# Patient Record
Sex: MALE | URBAN - METROPOLITAN AREA
[De-identification: names, ages, dates, MRNs, and addresses within clinical notes are randomized per-mention and may not be internally consistent; named-entity substitution may affect disease eponyms.]

---

## 2018-08-23 ENCOUNTER — OFFICE VISIT (OUTPATIENT)
Dept: URBAN - METROPOLITAN AREA CLINIC 23 | Facility: CLINIC | Age: 53
End: 2018-08-23
Payer: COMMERCIAL

## 2018-08-23 DIAGNOSIS — H25.813 COMBINED FORMS OF AGE-RELATED CATARACT, BILATERAL: Primary | ICD-10-CM

## 2018-08-23 PROCEDURE — 92136 OPHTHALMIC BIOMETRY: CPT | Performed by: OPHTHALMOLOGY

## 2018-08-23 PROCEDURE — 99213 OFFICE O/P EST LOW 20 MIN: CPT | Performed by: OPHTHALMOLOGY

## 2018-08-23 ASSESSMENT — KERATOMETRY
OD: 45.50
OS: 46.00

## 2018-08-23 ASSESSMENT — INTRAOCULAR PRESSURE
OD: 17
OS: 17

## 2018-08-23 ASSESSMENT — VISUAL ACUITY
OS: 20/50
OD: 20/25

## 2018-08-23 NOTE — IMPRESSION/PLAN
Impression: Combined forms of age-related cataract, bilateral: H25.813. Condition: quality of life issue. Symptoms: could improve with surgery. Vision: vision affected. Plan: Cataracts account for the patient's complaints. Discussed all risks, benefits, procedures and recovery. Patient understands changing glasses will not improve vision. Patient desires to have surgery, recommend phacoemulsification with intraocular lens. RL-2 Discussed IOL options. Discussed multifocal IOL vs standard IOL with distance correction. Discussed probable post-operative anisometropia.   Recommend Symfony IOL OU

## 2018-09-06 ENCOUNTER — SURGERY (OUTPATIENT)
Dept: URBAN - METROPOLITAN AREA SURGERY 11 | Facility: SURGERY | Age: 53
End: 2018-09-06
Payer: COMMERCIAL

## 2018-09-06 PROCEDURE — 66984 XCAPSL CTRC RMVL W/O ECP: CPT | Performed by: OPHTHALMOLOGY

## 2018-09-07 ENCOUNTER — POST-OPERATIVE VISIT (OUTPATIENT)
Dept: URBAN - METROPOLITAN AREA CLINIC 23 | Facility: CLINIC | Age: 53
End: 2018-09-07

## 2018-09-07 PROCEDURE — 99024 POSTOP FOLLOW-UP VISIT: CPT | Performed by: OPTOMETRIST

## 2018-09-07 ASSESSMENT — INTRAOCULAR PRESSURE
OS: 24
OD: 15

## 2018-09-13 ENCOUNTER — POST-OPERATIVE VISIT (OUTPATIENT)
Dept: URBAN - METROPOLITAN AREA CLINIC 23 | Facility: CLINIC | Age: 53
End: 2018-09-13

## 2018-09-13 PROCEDURE — 99024 POSTOP FOLLOW-UP VISIT: CPT | Performed by: OPTOMETRIST

## 2018-09-13 ASSESSMENT — INTRAOCULAR PRESSURE: OS: 23

## 2018-10-04 ENCOUNTER — POST-OPERATIVE VISIT (OUTPATIENT)
Dept: URBAN - METROPOLITAN AREA CLINIC 23 | Facility: CLINIC | Age: 53
End: 2018-10-04

## 2018-10-04 PROCEDURE — 99024 POSTOP FOLLOW-UP VISIT: CPT | Performed by: OPTOMETRIST

## 2018-10-04 ASSESSMENT — INTRAOCULAR PRESSURE: OD: 19

## 2018-10-04 ASSESSMENT — VISUAL ACUITY: OS: 20/20

## 2019-04-19 ENCOUNTER — OFFICE VISIT (OUTPATIENT)
Dept: URBAN - METROPOLITAN AREA CLINIC 23 | Facility: CLINIC | Age: 54
End: 2019-04-19
Payer: COMMERCIAL

## 2019-04-19 ENCOUNTER — SURGERY (OUTPATIENT)
Dept: URBAN - METROPOLITAN AREA SURGERY 11 | Facility: SURGERY | Age: 54
End: 2019-04-19
Payer: COMMERCIAL

## 2019-04-19 DIAGNOSIS — H33.022 RETINAL DETACHMENT WITH MULTIPLE BREAKS, LEFT EYE: Primary | ICD-10-CM

## 2019-04-19 PROCEDURE — 67108 REPAIR DETACHED RETINA: CPT | Performed by: OPHTHALMOLOGY

## 2019-04-19 PROCEDURE — 92014 COMPRE OPH EXAM EST PT 1/>: CPT | Performed by: OPHTHALMOLOGY

## 2019-04-19 PROCEDURE — 92014 COMPRE OPH EXAM EST PT 1/>: CPT | Performed by: OPTOMETRIST

## 2019-04-19 PROCEDURE — 92134 CPTRZ OPH DX IMG PST SGM RTA: CPT | Performed by: OPHTHALMOLOGY

## 2019-04-19 RX ORDER — OFLOXACIN 3 MG/ML
0.3 % SOLUTION/ DROPS OPHTHALMIC
Qty: 5 | Refills: 3 | Status: INACTIVE
Start: 2019-04-19 | End: 2019-04-29

## 2019-04-19 RX ORDER — PREDNISOLONE ACETATE 10 MG/ML
1 % SUSPENSION/ DROPS OPHTHALMIC
Qty: 10 | Refills: 5 | Status: INACTIVE
Start: 2019-04-19 | End: 2019-04-26

## 2019-04-19 ASSESSMENT — INTRAOCULAR PRESSURE
OS: 20
OS: 20
OD: 20
OD: 20
OS: 20
OD: 20

## 2019-04-19 NOTE — IMPRESSION/PLAN
Impression: Retinal detachment with single break, left eye: H33.012. OS. Condition: unstable. Vision: vision affected. Plan: Discussed diagnosis in detail with patient. Discussed risks of progression. Surgical treatment is recommended to repair the retina LEFT EYE PPVx. Surgical risks and benefits were discussed, explained and understood by patient. Unable to tell how much vision will be recovered. Discussed gas bubble and post-op care: no traveling, flying or high altitude for approximately 6 - 8 weeks. All questions answered. Patient elects to proceed with recommendation. RL1. Educational material provided to patient. Erx Prednisolone and Ofloxacin to the pharmacy. OCT OS shows a RD. Proceed with emergency surgery OS today.

## 2019-04-19 NOTE — IMPRESSION/PLAN
Impression: Retinal detachment with multiple breaks, left eye: H33.022. Plan: Discussed findings with pt. Recommend see retina specialist ASAP.

## 2019-04-20 ENCOUNTER — POST-OPERATIVE VISIT (OUTPATIENT)
Dept: URBAN - METROPOLITAN AREA CLINIC 23 | Facility: CLINIC | Age: 54
End: 2019-04-20

## 2019-04-20 DIAGNOSIS — Z09 ENCNTR FOR F/U EXAM AFT TRTMT FOR COND OTH THAN MALIG NEOPLM: Primary | ICD-10-CM

## 2019-04-20 ASSESSMENT — INTRAOCULAR PRESSURE
OS: 12
OD: 15

## 2019-04-23 ENCOUNTER — POST-OPERATIVE VISIT (OUTPATIENT)
Dept: URBAN - METROPOLITAN AREA CLINIC 23 | Facility: CLINIC | Age: 54
End: 2019-04-23

## 2019-04-23 ASSESSMENT — INTRAOCULAR PRESSURE
OS: 32
OD: 22

## 2019-04-26 ENCOUNTER — POST-OPERATIVE VISIT (OUTPATIENT)
Dept: URBAN - METROPOLITAN AREA CLINIC 23 | Facility: CLINIC | Age: 54
End: 2019-04-26

## 2019-04-26 PROCEDURE — 99024 POSTOP FOLLOW-UP VISIT: CPT | Performed by: OPHTHALMOLOGY

## 2019-04-26 RX ORDER — BRIMONIDINE TARTRATE, TIMOLOL MALEATE 2; 5 MG/ML; MG/ML
SOLUTION/ DROPS OPHTHALMIC
Qty: 5 | Refills: 5 | Status: INACTIVE
Start: 2019-04-26 | End: 2019-05-13

## 2019-04-26 RX ORDER — DORZOLAMIDE HYDROCHLORIDE 20 MG/ML
2 % SOLUTION OPHTHALMIC
Qty: 10 | Refills: 5 | Status: INACTIVE
Start: 2019-04-26 | End: 2019-09-18

## 2019-04-26 RX ORDER — LOTEPREDNOL ETABONATE 5 MG/ML
0.5 % SUSPENSION/ DROPS OPHTHALMIC
Qty: 5 | Refills: 5 | Status: INACTIVE
Start: 2019-04-26 | End: 2019-09-18

## 2019-04-26 ASSESSMENT — INTRAOCULAR PRESSURE
OS: 35
OD: 22

## 2019-04-29 ENCOUNTER — POST-OPERATIVE VISIT (OUTPATIENT)
Dept: URBAN - METROPOLITAN AREA CLINIC 23 | Facility: CLINIC | Age: 54
End: 2019-04-29
Payer: COMMERCIAL

## 2019-04-29 ASSESSMENT — INTRAOCULAR PRESSURE
OD: 20
OS: 22

## 2019-04-29 ASSESSMENT — VISUAL ACUITY: OD: 20/30

## 2019-05-03 ENCOUNTER — POST-OPERATIVE VISIT (OUTPATIENT)
Dept: URBAN - METROPOLITAN AREA CLINIC 23 | Facility: CLINIC | Age: 54
End: 2019-05-03

## 2019-05-03 PROCEDURE — 99024 POSTOP FOLLOW-UP VISIT: CPT | Performed by: OPHTHALMOLOGY

## 2019-05-03 ASSESSMENT — INTRAOCULAR PRESSURE
OD: 17
OS: 28

## 2019-05-10 ENCOUNTER — POST-OPERATIVE VISIT (OUTPATIENT)
Dept: URBAN - METROPOLITAN AREA CLINIC 23 | Facility: CLINIC | Age: 54
End: 2019-05-10

## 2019-05-10 RX ORDER — NETARSUDIL 0.2 MG/ML
0.02 % SOLUTION/ DROPS OPHTHALMIC; TOPICAL
Qty: 0 | Refills: 0 | Status: INACTIVE
Start: 2019-05-10 | End: 2019-09-18

## 2019-05-10 ASSESSMENT — INTRAOCULAR PRESSURE: OS: 34

## 2019-05-14 ENCOUNTER — POST-OPERATIVE VISIT (OUTPATIENT)
Dept: URBAN - METROPOLITAN AREA CLINIC 23 | Facility: CLINIC | Age: 54
End: 2019-05-14
Payer: COMMERCIAL

## 2019-05-14 ASSESSMENT — INTRAOCULAR PRESSURE: OS: 26

## 2019-06-04 ENCOUNTER — POST-OPERATIVE VISIT (OUTPATIENT)
Dept: URBAN - METROPOLITAN AREA CLINIC 23 | Facility: CLINIC | Age: 54
End: 2019-06-04

## 2019-06-04 PROCEDURE — 99024 POSTOP FOLLOW-UP VISIT: CPT | Performed by: OPHTHALMOLOGY

## 2019-06-04 ASSESSMENT — INTRAOCULAR PRESSURE
OS: 20
OD: 18

## 2019-07-18 ENCOUNTER — POST-OPERATIVE VISIT (OUTPATIENT)
Dept: URBAN - METROPOLITAN AREA CLINIC 23 | Facility: CLINIC | Age: 54
End: 2019-07-18

## 2019-07-18 PROCEDURE — 99024 POSTOP FOLLOW-UP VISIT: CPT | Performed by: OPHTHALMOLOGY

## 2019-07-18 ASSESSMENT — INTRAOCULAR PRESSURE
OS: 22
OD: 22

## 2019-09-18 ENCOUNTER — OFFICE VISIT (OUTPATIENT)
Dept: URBAN - METROPOLITAN AREA CLINIC 23 | Facility: CLINIC | Age: 54
End: 2019-09-18
Payer: COMMERCIAL

## 2019-09-18 DIAGNOSIS — H35.411 LATTICE DEGENERATION OF RETINA, RIGHT EYE: ICD-10-CM

## 2019-09-18 PROCEDURE — 92134 CPTRZ OPH DX IMG PST SGM RTA: CPT | Performed by: OPHTHALMOLOGY

## 2019-09-18 PROCEDURE — 99213 OFFICE O/P EST LOW 20 MIN: CPT | Performed by: OPHTHALMOLOGY

## 2019-09-18 ASSESSMENT — INTRAOCULAR PRESSURE
OD: 14
OS: 17

## 2019-09-18 NOTE — IMPRESSION/PLAN
Impression: s/p PPVX for Repair of Retinal detachment with single break, left eye w/Gas  04/19/2019: H33.012. OS. Condition: resolved with surgery. Vision: vision affected but improving. Plan: Discussed diagnosis in detail with patient. Exam OS shows the retina is fully attached and stable. No treatment is required at this time. Patient states pupil size OS has decreased some. OCT OS is stable. Recommend a retina f/u in 3 mos. Patient can proceed with a refraction.

## 2019-09-18 NOTE — IMPRESSION/PLAN
Impression: Lattice degeneration of retina, right eye: H35.411. OD. Condition: stable. Plan: Discussed risks of progression. Discussed signs and symptoms of retinal detachment. Discussed signs and symptoms of PVD/floaters. Due to history of Retinal Detachment in the left eye, recommend prophylactic laser treatment in the right eye to treat the weak area as a preventive measure. Discussed the risks and benefits of tx. Patient defers treatment at this time. Will continue to monitor condition.

## 2019-12-17 ENCOUNTER — OFFICE VISIT (OUTPATIENT)
Dept: URBAN - METROPOLITAN AREA CLINIC 23 | Facility: CLINIC | Age: 54
End: 2019-12-17
Payer: COMMERCIAL

## 2019-12-17 DIAGNOSIS — H33.012 RETINAL DETACHMENT WITH SINGLE BREAK, LEFT EYE: Primary | ICD-10-CM

## 2019-12-17 PROCEDURE — 99213 OFFICE O/P EST LOW 20 MIN: CPT | Performed by: OPHTHALMOLOGY

## 2019-12-17 PROCEDURE — 92134 CPTRZ OPH DX IMG PST SGM RTA: CPT | Performed by: OPHTHALMOLOGY

## 2019-12-17 ASSESSMENT — INTRAOCULAR PRESSURE
OS: 18
OD: 19

## 2019-12-17 NOTE — IMPRESSION/PLAN
Impression: Lattice degeneration of retina, right eye: H35.411. OD. Condition: stable. Plan: Discussed risks of progression. Discussed signs and symptoms of retinal detachment. Discussed signs and symptoms of PVD/floaters. Due to history of Retinal Detachment in the left eye, recommend prophylactic laser treatment in the right eye to treat the weak area as a preventive measure. Discussed the risks and benefits of tx. All questions answered. RL1. Patient defers treatment at this time. Will continue to monitor condition, recommend a retina follow - up in 4 mos. Advised patient to come in ASAP if there is a change or decrease in vision.

## 2019-12-17 NOTE — IMPRESSION/PLAN
Impression: s/p PPVX for Repair of Retinal detachment with single break, left eye w/Gas  04/19/2019: H33.012. OS. Condition: resolved with surgery  and stable. Vision: vision affected but improved. Plan: Discussed diagnosis in detail with patient. Exam OS shows the retina is fully attached and stable. No additional treatment is required at this time. Maybe there is a minimal amount of inflammation. Recommend observation for now. Will reassess the retina in 4 - 6 mos. OCT OS shows ? minimal CME, no foveal depression, no ERM.

## 2022-01-06 ENCOUNTER — OFFICE VISIT (OUTPATIENT)
Dept: URBAN - METROPOLITAN AREA CLINIC 23 | Facility: CLINIC | Age: 57
End: 2022-01-06
Payer: COMMERCIAL

## 2022-01-06 DIAGNOSIS — H40.1134 PRIMARY OPEN-ANGLE GLAUCOMA, INDETERMINATE, BILATERAL: ICD-10-CM

## 2022-01-06 DIAGNOSIS — H52.4 PRESBYOPIA: ICD-10-CM

## 2022-01-06 DIAGNOSIS — H25.041 POSTERIOR SUBCAPSULAR POLAR AGE RELATED CATARACT, RIGHT EYE: Primary | ICD-10-CM

## 2022-01-06 PROCEDURE — 92133 CPTRZD OPH DX IMG PST SGM ON: CPT | Performed by: OPTOMETRIST

## 2022-01-06 PROCEDURE — 76514 ECHO EXAM OF EYE THICKNESS: CPT | Performed by: OPTOMETRIST

## 2022-01-06 PROCEDURE — 99213 OFFICE O/P EST LOW 20 MIN: CPT | Performed by: OPTOMETRIST

## 2022-01-06 RX ORDER — LATANOPROST 50 UG/ML
0.005 % SOLUTION OPHTHALMIC
Qty: 5 | Refills: 0 | Status: INACTIVE
Start: 2022-01-06 | End: 2022-02-09

## 2022-01-06 ASSESSMENT — KERATOMETRY
OS: 46.00
OD: 45.75

## 2022-01-06 ASSESSMENT — INTRAOCULAR PRESSURE
OS: 37
OS: 38
OD: 23
OD: 22

## 2022-01-06 ASSESSMENT — VISUAL ACUITY
OS: 20/30
OD: 20/30

## 2022-01-06 NOTE — IMPRESSION/PLAN
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 Condition: worsening. Plan: Discussed findings. Recommend waiting on cataract surgery for now. New glasses rx given, call with any vision changes.

## 2022-01-06 NOTE — IMPRESSION/PLAN
Impression: Presbyopia: H52.4 Bilateral. Condition: moderate. Plan: Discussed findings. New Rx given.

## 2022-01-06 NOTE — IMPRESSION/PLAN
Impression: Primary open-angle glaucoma, indeterminate, bilateral: H40.1134 Bilateral. Condition: will continue to monitor. Plan: Discussed findings. IOP elevated today OS > OD, RNFL OCT ordered and shows signs of thinning in OS, poor quality OD. Patient to begin Latanoprost QHS OU, sample given. Call with any vision changes.

## 2022-02-09 ENCOUNTER — OFFICE VISIT (OUTPATIENT)
Dept: URBAN - METROPOLITAN AREA CLINIC 23 | Facility: CLINIC | Age: 57
End: 2022-02-09
Payer: COMMERCIAL

## 2022-02-09 DIAGNOSIS — H40.1132 PRIMARY OPEN-ANGLE GLAUCOMA, MODERATE STAGE, BILATERAL: Primary | ICD-10-CM

## 2022-02-09 PROCEDURE — 92133 CPTRZD OPH DX IMG PST SGM ON: CPT | Performed by: OPTOMETRIST

## 2022-02-09 PROCEDURE — 99214 OFFICE O/P EST MOD 30 MIN: CPT | Performed by: OPTOMETRIST

## 2022-02-09 PROCEDURE — 92083 EXTENDED VISUAL FIELD XM: CPT | Performed by: OPTOMETRIST

## 2022-02-09 RX ORDER — TIMOLOL 5.12 MG/ML
0.5 % SOLUTION/ DROPS OPHTHALMIC
Qty: 5 | Refills: 0 | Status: ACTIVE
Start: 2022-02-09

## 2022-02-09 ASSESSMENT — INTRAOCULAR PRESSURE
OD: 24
OD: 26
OS: 38

## 2022-02-09 NOTE — IMPRESSION/PLAN
Impression: Primary open-angle glaucoma, moderate stage, bilateral: H40.1132 Bilateral. Condition: worsening. Plan: Pt has Glaucoma         Pachs: 539/554     Today's IOP : 26/38        Tmax  : 26/38 Target IOP low to mid teens Pt denies Fhx of Glaucoma Right eye is the better seeing eye VF: 02/09/2022, nasal step OD/inferior arcuate OS
C/D: .4 round OU
OCT: 02/09/2022, 68 OD/58 OS
DE: 12/17/2019, overdue Pt denies Sulfa Allergy   // Pt denies Lung /Heart dx Plan :
1. Begin:
Timolol BID OU, sample given of Betimol. 2. IOP remains elevated, unchanged from last visit. Change Latanoprost to Timolol. Recommend consult with glaucoma specialist for further treatment. 3. 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

## 2022-05-24 ENCOUNTER — OFFICE VISIT (OUTPATIENT)
Dept: URBAN - METROPOLITAN AREA CLINIC 23 | Facility: CLINIC | Age: 57
End: 2022-05-24
Payer: COMMERCIAL

## 2022-05-24 DIAGNOSIS — H25.041 POSTERIOR SUBCAPSULAR POLAR AGE RELATED CATARACT, RIGHT EYE: ICD-10-CM

## 2022-05-24 DIAGNOSIS — H40.1132 PRIMARY OPEN-ANGLE GLAUCOMA, MODERATE STAGE, BILATERAL: Primary | ICD-10-CM

## 2022-05-24 PROCEDURE — 92020 GONIOSCOPY: CPT | Performed by: OPHTHALMOLOGY

## 2022-05-24 PROCEDURE — 99214 OFFICE O/P EST MOD 30 MIN: CPT | Performed by: OPHTHALMOLOGY

## 2022-05-24 RX ORDER — TIMOLOL MALEATE 5 MG/ML
0.5 % SOLUTION/ DROPS OPHTHALMIC
Qty: 10 | Refills: 5 | Status: ACTIVE
Start: 2022-05-24

## 2022-05-24 ASSESSMENT — INTRAOCULAR PRESSURE
OD: 17
OS: 17

## 2022-05-24 NOTE — IMPRESSION/PLAN
Impression: Primary open-angle glaucoma, moderate stage, bilateral: H40.1132 Bilateral. Condition: worsening.
h/o RD s/p repair and CEIOL OS Plan: Pt has Glaucoma Gonio CBB 1+       Pachs: 539/554     Today's IOP : 17/17        Tmax  : 26/38 Target IOP low to mid teens Pt denies Fhx of Glaucoma Right eye is the better seeing eye VF: 02/09/2022, nasal step OD/inferior arcuate OS
C/D: .4 round OU
OCT: 02/09/2022, 68 OD/58 OS
DE: 12/17/2019, overdue Pt denies Sulfa Allergy   // Pt denies Lung /Heart dx Plan :
1. Begin:
Timolol QAM OU
2. IOP much improved with the change in medication. Discussed details about Glaucoma and that without proper control of pressures irreversible blindness can occur. Patient understands risks. Emphasize compliance with drop and without compliance vision loss progression can occur. 
 Patient to RTC 4-6 Months with Dr Jaylon Walter for IOP & VF (Glaucoma PRN)

## 2022-05-24 NOTE — IMPRESSION/PLAN
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 Condition: worsening. Plan: Cataracts account for the patient's complaints. No treatment currently recommended. The patient will monitor vision changes and contact us with any decrease in vision.

## 2022-10-19 ENCOUNTER — OFFICE VISIT (OUTPATIENT)
Dept: URBAN - METROPOLITAN AREA CLINIC 23 | Facility: CLINIC | Age: 57
End: 2022-10-19
Payer: COMMERCIAL

## 2022-10-19 DIAGNOSIS — H40.1132 PRIMARY OPEN-ANGLE GLAUCOMA, MODERATE STAGE, BILATERAL: Primary | ICD-10-CM

## 2022-10-19 DIAGNOSIS — H25.811 COMBINED FORMS OF AGE-RELATED CATARACT, RIGHT EYE: ICD-10-CM

## 2022-10-19 PROCEDURE — 99213 OFFICE O/P EST LOW 20 MIN: CPT | Performed by: OPTOMETRIST

## 2022-10-19 PROCEDURE — 92133 CPTRZD OPH DX IMG PST SGM ON: CPT | Performed by: OPTOMETRIST

## 2022-10-19 RX ORDER — TIMOLOL MALEATE 5 MG/ML
0.5 % SOLUTION/ DROPS OPHTHALMIC
Qty: 15 | Refills: 3 | Status: ACTIVE
Start: 2022-10-19

## 2022-10-19 ASSESSMENT — KERATOMETRY
OD: 45.88
OS: 46.13

## 2022-10-19 ASSESSMENT — INTRAOCULAR PRESSURE
OS: 17
OD: 18

## 2022-10-19 NOTE — IMPRESSION/PLAN
Impression: Primary open-angle glaucoma, moderate stage, bilateral: H40.1132 Bilateral. Condition: worsening. Plan: Pt has Glaucoma         Pachs: 539/554     Today's IOP : 18/17        Tmax  : 26/38 Target IOP low to mid teens Pt denies Fhx of Glaucoma Right eye is the better seeing eye VF: 02/09/2022, nasal step OD/inferior arcuate OS
C/D: .3x. 4 / .5 round OU
OCT: 10/19/2022 70 OD / 69 OS
DE: 10/19/2022 Pt denies Sulfa Allergy   // Pt denies Lung /Heart dx Plan :
1. Continue Timolol QAM OU
2. IOP and ONH stable, no change to current treatment necessary. Ordered and reviewed RNFL OCT. Call with any vision changes.

## 2022-10-19 NOTE — IMPRESSION/PLAN
Impression: Combined forms of age-related cataract, right eye: H25.811. Right. Condition: established, worsening. Plan: Discussed findings. Worsening cataract in OD. Patient desires no treatment at this time. Monitor, call with any vision changes.

## 2023-05-03 ENCOUNTER — OFFICE VISIT (OUTPATIENT)
Dept: URBAN - METROPOLITAN AREA CLINIC 23 | Facility: CLINIC | Age: 58
End: 2023-05-03
Payer: COMMERCIAL

## 2023-05-03 DIAGNOSIS — H40.1132 PRIMARY OPEN-ANGLE GLAUCOMA, BILATERAL, MODERATE STAGE: Primary | ICD-10-CM

## 2023-05-03 DIAGNOSIS — H25.811 COMBINED FORMS OF AGE-RELATED CATARACT, RIGHT EYE: ICD-10-CM

## 2023-05-03 PROCEDURE — 92083 EXTENDED VISUAL FIELD XM: CPT | Performed by: OPTOMETRIST

## 2023-05-03 PROCEDURE — 99213 OFFICE O/P EST LOW 20 MIN: CPT | Performed by: OPTOMETRIST

## 2023-05-03 ASSESSMENT — KERATOMETRY
OD: 45.88
OS: 46.13

## 2023-05-03 ASSESSMENT — INTRAOCULAR PRESSURE
OS: 18
OD: 18

## 2023-11-15 DIAGNOSIS — H52.13 MYOPIA, BILATERAL: ICD-10-CM

## 2023-11-15 DIAGNOSIS — H40.1132 PRIMARY OPEN-ANGLE GLAUCOMA, BILATERAL, MODERATE STAGE: ICD-10-CM

## 2023-11-15 DIAGNOSIS — H35.372 PUCKERING OF MACULA, LEFT EYE: ICD-10-CM

## 2023-11-15 DIAGNOSIS — H25.811 COMBINED FORMS OF AGE-RELATED CATARACT, RIGHT EYE: Primary | ICD-10-CM

## 2023-11-15 PROCEDURE — 92134 CPTRZ OPH DX IMG PST SGM RTA: CPT | Performed by: OPTOMETRIST

## 2023-11-15 PROCEDURE — 99213 OFFICE O/P EST LOW 20 MIN: CPT | Performed by: OPTOMETRIST
